# Patient Record
Sex: MALE | Race: BLACK OR AFRICAN AMERICAN | NOT HISPANIC OR LATINO | URBAN - METROPOLITAN AREA
[De-identification: names, ages, dates, MRNs, and addresses within clinical notes are randomized per-mention and may not be internally consistent; named-entity substitution may affect disease eponyms.]

---

## 2022-12-17 ENCOUNTER — TELEPHONE (OUTPATIENT)
Dept: URGENT CARE | Facility: CLINIC | Age: 27
End: 2022-12-17

## 2022-12-17 NOTE — TELEPHONE ENCOUNTER
Spoke to patient and told him his test was positive for Chlamydia. He is taking doxycycline and told him he is treated after 7 days. Recommended letting his partner know so they can also be treated.